# Patient Record
Sex: MALE | Race: WHITE | NOT HISPANIC OR LATINO
[De-identification: names, ages, dates, MRNs, and addresses within clinical notes are randomized per-mention and may not be internally consistent; named-entity substitution may affect disease eponyms.]

---

## 2017-04-06 ENCOUNTER — TRANSCRIPTION ENCOUNTER (OUTPATIENT)
Age: 27
End: 2017-04-06

## 2017-04-06 PROBLEM — Z00.00 ENCOUNTER FOR PREVENTIVE HEALTH EXAMINATION: Status: ACTIVE | Noted: 2017-04-06

## 2017-04-24 ENCOUNTER — APPOINTMENT (OUTPATIENT)
Dept: SURGERY | Facility: CLINIC | Age: 27
End: 2017-04-24

## 2017-05-01 ENCOUNTER — APPOINTMENT (OUTPATIENT)
Dept: SURGERY | Facility: CLINIC | Age: 27
End: 2017-05-01

## 2017-05-01 VITALS
OXYGEN SATURATION: 97 % | TEMPERATURE: 97.7 F | DIASTOLIC BLOOD PRESSURE: 90 MMHG | HEIGHT: 75 IN | HEART RATE: 100 BPM | WEIGHT: 315 LBS | BODY MASS INDEX: 39.17 KG/M2 | SYSTOLIC BLOOD PRESSURE: 134 MMHG | RESPIRATION RATE: 16 BRPM

## 2017-05-01 DIAGNOSIS — M10.9 GOUT, UNSPECIFIED: ICD-10-CM

## 2017-05-01 DIAGNOSIS — Z83.3 FAMILY HISTORY OF DIABETES MELLITUS: ICD-10-CM

## 2017-05-01 DIAGNOSIS — Z78.9 OTHER SPECIFIED HEALTH STATUS: ICD-10-CM

## 2017-09-11 ENCOUNTER — APPOINTMENT (OUTPATIENT)
Dept: SURGERY | Facility: CLINIC | Age: 27
End: 2017-09-11
Payer: COMMERCIAL

## 2017-09-11 PROCEDURE — 99215 OFFICE O/P EST HI 40 MIN: CPT

## 2017-09-22 ENCOUNTER — OUTPATIENT (OUTPATIENT)
Dept: OUTPATIENT SERVICES | Facility: HOSPITAL | Age: 27
LOS: 1 days | End: 2017-09-22
Payer: COMMERCIAL

## 2017-09-22 VITALS
TEMPERATURE: 98 F | WEIGHT: 315 LBS | DIASTOLIC BLOOD PRESSURE: 82 MMHG | SYSTOLIC BLOOD PRESSURE: 125 MMHG | HEART RATE: 84 BPM | OXYGEN SATURATION: 97 % | HEIGHT: 75 IN

## 2017-09-22 DIAGNOSIS — E66.01 MORBID (SEVERE) OBESITY DUE TO EXCESS CALORIES: ICD-10-CM

## 2017-09-22 DIAGNOSIS — Z01.818 ENCOUNTER FOR OTHER PREPROCEDURAL EXAMINATION: ICD-10-CM

## 2017-09-22 DIAGNOSIS — S42.009A FRACTURE OF UNSPECIFIED PART OF UNSPECIFIED CLAVICLE, INITIAL ENCOUNTER FOR CLOSED FRACTURE: Chronic | ICD-10-CM

## 2017-09-22 DIAGNOSIS — Z41.9 ENCOUNTER FOR PROCEDURE FOR PURPOSES OTHER THAN REMEDYING HEALTH STATE, UNSPECIFIED: Chronic | ICD-10-CM

## 2017-09-22 DIAGNOSIS — G47.33 OBSTRUCTIVE SLEEP APNEA (ADULT) (PEDIATRIC): ICD-10-CM

## 2017-09-22 LAB
BLD GP AB SCN SERPL QL: NEGATIVE — SIGNIFICANT CHANGE UP
HBA1C BLD-MCNC: 5.5 % — SIGNIFICANT CHANGE UP (ref 4–5.6)
HCT VFR BLD CALC: 40.7 % — SIGNIFICANT CHANGE UP (ref 39–50)
HGB BLD-MCNC: 13.6 G/DL — SIGNIFICANT CHANGE UP (ref 13–17)
MCHC RBC-ENTMCNC: 28.9 PG — SIGNIFICANT CHANGE UP (ref 27–34)
MCHC RBC-ENTMCNC: 33.4 GM/DL — SIGNIFICANT CHANGE UP (ref 32–36)
MCV RBC AUTO: 86.4 FL — SIGNIFICANT CHANGE UP (ref 80–100)
PLATELET # BLD AUTO: 214 K/UL — SIGNIFICANT CHANGE UP (ref 150–400)
RBC # BLD: 4.71 M/UL — SIGNIFICANT CHANGE UP (ref 4.2–5.8)
RBC # FLD: 12.8 % — SIGNIFICANT CHANGE UP (ref 10.3–14.5)
RH IG SCN BLD-IMP: POSITIVE — SIGNIFICANT CHANGE UP
WBC # BLD: 9.11 K/UL — SIGNIFICANT CHANGE UP (ref 3.8–10.5)
WBC # FLD AUTO: 9.11 K/UL — SIGNIFICANT CHANGE UP (ref 3.8–10.5)

## 2017-09-22 PROCEDURE — 86900 BLOOD TYPING SEROLOGIC ABO: CPT

## 2017-09-22 PROCEDURE — 83036 HEMOGLOBIN GLYCOSYLATED A1C: CPT

## 2017-09-22 PROCEDURE — 86850 RBC ANTIBODY SCREEN: CPT

## 2017-09-22 PROCEDURE — 86901 BLOOD TYPING SEROLOGIC RH(D): CPT

## 2017-09-22 PROCEDURE — G0463: CPT

## 2017-09-22 PROCEDURE — 80053 COMPREHEN METABOLIC PANEL: CPT

## 2017-09-22 PROCEDURE — 85027 COMPLETE CBC AUTOMATED: CPT

## 2017-09-22 NOTE — H&P PST ADULT - PSH
Collar bone fracture  repair of collar bone with hardware  Elective surgery  open reduction right hand 2012

## 2017-09-22 NOTE — H&P PST ADULT - HISTORY OF PRESENT ILLNESS
27 year old male with morbid obesity BMI 49.1  presents  to [facility] for Laparoscopic vertical sleeve gastrectomy 27 year old male with JAYLAN, morbid obesity BMI 49.1  presents  to PST for Laparoscopic vertical sleeve gastrectomy

## 2017-09-23 LAB
ALBUMIN SERPL ELPH-MCNC: 4.1 G/DL — SIGNIFICANT CHANGE UP (ref 3.3–5)
ALP SERPL-CCNC: 92 U/L — SIGNIFICANT CHANGE UP (ref 40–120)
ALT FLD-CCNC: 22 U/L — SIGNIFICANT CHANGE UP (ref 10–45)
ANION GAP SERPL CALC-SCNC: 17 MMOL/L — SIGNIFICANT CHANGE UP (ref 5–17)
AST SERPL-CCNC: 22 U/L — SIGNIFICANT CHANGE UP (ref 10–40)
BILIRUB SERPL-MCNC: 0.4 MG/DL — SIGNIFICANT CHANGE UP (ref 0.2–1.2)
BUN SERPL-MCNC: 21 MG/DL — SIGNIFICANT CHANGE UP (ref 7–23)
CALCIUM SERPL-MCNC: 9.6 MG/DL — SIGNIFICANT CHANGE UP (ref 8.4–10.5)
CHLORIDE SERPL-SCNC: 101 MMOL/L — SIGNIFICANT CHANGE UP (ref 96–108)
CO2 SERPL-SCNC: 23 MMOL/L — SIGNIFICANT CHANGE UP (ref 22–31)
CREAT SERPL-MCNC: 1.16 MG/DL — SIGNIFICANT CHANGE UP (ref 0.5–1.3)
GLUCOSE SERPL-MCNC: 107 MG/DL — HIGH (ref 70–99)
POTASSIUM SERPL-MCNC: 3.9 MMOL/L — SIGNIFICANT CHANGE UP (ref 3.5–5.3)
POTASSIUM SERPL-SCNC: 3.9 MMOL/L — SIGNIFICANT CHANGE UP (ref 3.5–5.3)
PROT SERPL-MCNC: 7.4 G/DL — SIGNIFICANT CHANGE UP (ref 6–8.3)
SODIUM SERPL-SCNC: 141 MMOL/L — SIGNIFICANT CHANGE UP (ref 135–145)

## 2017-10-04 ENCOUNTER — APPOINTMENT (OUTPATIENT)
Dept: SURGERY | Facility: HOSPITAL | Age: 27
End: 2017-10-04
Payer: COMMERCIAL

## 2017-10-04 ENCOUNTER — RESULT REVIEW (OUTPATIENT)
Age: 27
End: 2017-10-04

## 2017-10-04 ENCOUNTER — INPATIENT (INPATIENT)
Facility: HOSPITAL | Age: 27
LOS: 0 days | Discharge: HOME | DRG: 621 | End: 2017-10-05
Attending: SURGERY | Admitting: SURGERY
Payer: COMMERCIAL

## 2017-10-04 ENCOUNTER — TRANSCRIPTION ENCOUNTER (OUTPATIENT)
Age: 27
End: 2017-10-04

## 2017-10-04 VITALS
WEIGHT: 315 LBS | SYSTOLIC BLOOD PRESSURE: 123 MMHG | RESPIRATION RATE: 18 BRPM | TEMPERATURE: 98 F | HEIGHT: 75 IN | OXYGEN SATURATION: 97 % | HEART RATE: 65 BPM | DIASTOLIC BLOOD PRESSURE: 80 MMHG

## 2017-10-04 DIAGNOSIS — E66.01 MORBID (SEVERE) OBESITY DUE TO EXCESS CALORIES: ICD-10-CM

## 2017-10-04 DIAGNOSIS — Z41.9 ENCOUNTER FOR PROCEDURE FOR PURPOSES OTHER THAN REMEDYING HEALTH STATE, UNSPECIFIED: Chronic | ICD-10-CM

## 2017-10-04 DIAGNOSIS — M79.652 PAIN IN LEFT THIGH: ICD-10-CM

## 2017-10-04 DIAGNOSIS — S42.009A FRACTURE OF UNSPECIFIED PART OF UNSPECIFIED CLAVICLE, INITIAL ENCOUNTER FOR CLOSED FRACTURE: Chronic | ICD-10-CM

## 2017-10-04 LAB
ANION GAP SERPL CALC-SCNC: 14 MMOL/L — SIGNIFICANT CHANGE UP (ref 5–17)
BASOPHILS # BLD AUTO: 0 K/UL — SIGNIFICANT CHANGE UP (ref 0–0.2)
BASOPHILS NFR BLD AUTO: 0.1 % — SIGNIFICANT CHANGE UP (ref 0–2)
BUN SERPL-MCNC: 13 MG/DL — SIGNIFICANT CHANGE UP (ref 7–23)
CALCIUM SERPL-MCNC: 8.6 MG/DL — SIGNIFICANT CHANGE UP (ref 8.4–10.5)
CHLORIDE SERPL-SCNC: 101 MMOL/L — SIGNIFICANT CHANGE UP (ref 96–108)
CO2 SERPL-SCNC: 24 MMOL/L — SIGNIFICANT CHANGE UP (ref 22–31)
CREAT SERPL-MCNC: 1.05 MG/DL — SIGNIFICANT CHANGE UP (ref 0.5–1.3)
EOSINOPHIL # BLD AUTO: 0 K/UL — SIGNIFICANT CHANGE UP (ref 0–0.5)
EOSINOPHIL NFR BLD AUTO: 0.2 % — SIGNIFICANT CHANGE UP (ref 0–6)
GLUCOSE SERPL-MCNC: 159 MG/DL — HIGH (ref 70–99)
HCT VFR BLD CALC: 43.7 % — SIGNIFICANT CHANGE UP (ref 39–50)
HGB BLD-MCNC: 14.6 G/DL — SIGNIFICANT CHANGE UP (ref 13–17)
LYMPHOCYTES # BLD AUTO: 1.8 K/UL — SIGNIFICANT CHANGE UP (ref 1–3.3)
LYMPHOCYTES # BLD AUTO: 12.1 % — LOW (ref 13–44)
MAGNESIUM SERPL-MCNC: 2 MG/DL — SIGNIFICANT CHANGE UP (ref 1.6–2.6)
MCHC RBC-ENTMCNC: 30.4 PG — SIGNIFICANT CHANGE UP (ref 27–34)
MCHC RBC-ENTMCNC: 33.4 GM/DL — SIGNIFICANT CHANGE UP (ref 32–36)
MCV RBC AUTO: 91.2 FL — SIGNIFICANT CHANGE UP (ref 80–100)
MONOCYTES # BLD AUTO: 0.3 K/UL — SIGNIFICANT CHANGE UP (ref 0–0.9)
MONOCYTES NFR BLD AUTO: 2.2 % — SIGNIFICANT CHANGE UP (ref 2–14)
NEUTROPHILS # BLD AUTO: 12.5 K/UL — HIGH (ref 1.8–7.4)
NEUTROPHILS NFR BLD AUTO: 85.4 % — HIGH (ref 43–77)
PHOSPHATE SERPL-MCNC: 1.9 MG/DL — LOW (ref 2.5–4.5)
PLATELET # BLD AUTO: 188 K/UL — SIGNIFICANT CHANGE UP (ref 150–400)
POTASSIUM SERPL-MCNC: 4.2 MMOL/L — SIGNIFICANT CHANGE UP (ref 3.5–5.3)
POTASSIUM SERPL-SCNC: 4.2 MMOL/L — SIGNIFICANT CHANGE UP (ref 3.5–5.3)
RBC # BLD: 4.79 M/UL — SIGNIFICANT CHANGE UP (ref 4.2–5.8)
RBC # FLD: 11.6 % — SIGNIFICANT CHANGE UP (ref 10.3–14.5)
RH IG SCN BLD-IMP: POSITIVE — SIGNIFICANT CHANGE UP
SODIUM SERPL-SCNC: 139 MMOL/L — SIGNIFICANT CHANGE UP (ref 135–145)
WBC # BLD: 14.7 K/UL — HIGH (ref 3.8–10.5)
WBC # FLD AUTO: 14.7 K/UL — HIGH (ref 3.8–10.5)

## 2017-10-04 PROCEDURE — 88305 TISSUE EXAM BY PATHOLOGIST: CPT | Mod: 26

## 2017-10-04 PROCEDURE — 43775 LAP SLEEVE GASTRECTOMY: CPT

## 2017-10-04 RX ORDER — HYDROMORPHONE HYDROCHLORIDE 2 MG/ML
1 INJECTION INTRAMUSCULAR; INTRAVENOUS; SUBCUTANEOUS
Qty: 0 | Refills: 0 | Status: DISCONTINUED | OUTPATIENT
Start: 2017-10-04 | End: 2017-10-04

## 2017-10-04 RX ORDER — ACETAMINOPHEN 500 MG
1000 TABLET ORAL ONCE
Qty: 0 | Refills: 0 | Status: DISCONTINUED | OUTPATIENT
Start: 2017-10-05 | End: 2017-10-05

## 2017-10-04 RX ORDER — SODIUM CHLORIDE 9 MG/ML
3 INJECTION INTRAMUSCULAR; INTRAVENOUS; SUBCUTANEOUS EVERY 8 HOURS
Qty: 0 | Refills: 0 | Status: DISCONTINUED | OUTPATIENT
Start: 2017-10-04 | End: 2017-10-04

## 2017-10-04 RX ORDER — SODIUM CHLORIDE 9 MG/ML
1000 INJECTION, SOLUTION INTRAVENOUS
Qty: 0 | Refills: 0 | Status: DISCONTINUED | OUTPATIENT
Start: 2017-10-04 | End: 2017-10-05

## 2017-10-04 RX ORDER — POTASSIUM CHLORIDE 20 MEQ
10 PACKET (EA) ORAL
Qty: 0 | Refills: 0 | Status: DISCONTINUED | OUTPATIENT
Start: 2017-10-04 | End: 2017-10-05

## 2017-10-04 RX ORDER — CEFAZOLIN SODIUM 1 G
3000 VIAL (EA) INJECTION EVERY 8 HOURS
Qty: 0 | Refills: 0 | Status: COMPLETED | OUTPATIENT
Start: 2017-10-04 | End: 2017-10-05

## 2017-10-04 RX ORDER — ONDANSETRON 8 MG/1
4 TABLET, FILM COATED ORAL ONCE
Qty: 0 | Refills: 0 | Status: COMPLETED | OUTPATIENT
Start: 2017-10-04 | End: 2017-10-04

## 2017-10-04 RX ORDER — PANTOPRAZOLE SODIUM 20 MG/1
40 TABLET, DELAYED RELEASE ORAL DAILY
Qty: 0 | Refills: 0 | Status: DISCONTINUED | OUTPATIENT
Start: 2017-10-04 | End: 2017-10-05

## 2017-10-04 RX ORDER — ONDANSETRON 8 MG/1
4 TABLET, FILM COATED ORAL EVERY 6 HOURS
Qty: 0 | Refills: 0 | Status: DISCONTINUED | OUTPATIENT
Start: 2017-10-04 | End: 2017-10-05

## 2017-10-04 RX ORDER — CEFAZOLIN SODIUM 1 G
3000 VIAL (EA) INJECTION ONCE
Qty: 0 | Refills: 0 | Status: DISCONTINUED | OUTPATIENT
Start: 2017-10-04 | End: 2017-10-05

## 2017-10-04 RX ORDER — ACETAMINOPHEN 500 MG
1000 TABLET ORAL ONCE
Qty: 0 | Refills: 0 | Status: COMPLETED | OUTPATIENT
Start: 2017-10-04 | End: 2017-10-04

## 2017-10-04 RX ORDER — HEPARIN SODIUM 5000 [USP'U]/ML
5000 INJECTION INTRAVENOUS; SUBCUTANEOUS ONCE
Qty: 0 | Refills: 0 | Status: COMPLETED | OUTPATIENT
Start: 2017-10-04 | End: 2017-10-04

## 2017-10-04 RX ORDER — ACETAMINOPHEN 500 MG
1000 TABLET ORAL ONCE
Qty: 0 | Refills: 0 | Status: COMPLETED | OUTPATIENT
Start: 2017-10-05 | End: 2017-10-05

## 2017-10-04 RX ORDER — HEPARIN SODIUM 5000 [USP'U]/ML
5000 INJECTION INTRAVENOUS; SUBCUTANEOUS EVERY 8 HOURS
Qty: 0 | Refills: 0 | Status: DISCONTINUED | OUTPATIENT
Start: 2017-10-04 | End: 2017-10-05

## 2017-10-04 RX ORDER — HYOSCYAMINE SULFATE 0.13 MG
0.12 TABLET ORAL EVERY 6 HOURS
Qty: 0 | Refills: 0 | Status: DISCONTINUED | OUTPATIENT
Start: 2017-10-04 | End: 2017-10-05

## 2017-10-04 RX ORDER — KETOROLAC TROMETHAMINE 30 MG/ML
30 SYRINGE (ML) INJECTION EVERY 6 HOURS
Qty: 0 | Refills: 0 | Status: DISCONTINUED | OUTPATIENT
Start: 2017-10-04 | End: 2017-10-05

## 2017-10-04 RX ADMIN — HYDROMORPHONE HYDROCHLORIDE 1 MILLIGRAM(S): 2 INJECTION INTRAMUSCULAR; INTRAVENOUS; SUBCUTANEOUS at 10:01

## 2017-10-04 RX ADMIN — PANTOPRAZOLE SODIUM 40 MILLIGRAM(S): 20 TABLET, DELAYED RELEASE ORAL at 12:13

## 2017-10-04 RX ADMIN — Medication 400 MILLIGRAM(S): at 21:55

## 2017-10-04 RX ADMIN — Medication 63.75 MILLIMOLE(S): at 18:57

## 2017-10-04 RX ADMIN — SODIUM CHLORIDE 250 MILLILITER(S): 9 INJECTION, SOLUTION INTRAVENOUS at 11:21

## 2017-10-04 RX ADMIN — HYDROMORPHONE HYDROCHLORIDE 1 MILLIGRAM(S): 2 INJECTION INTRAMUSCULAR; INTRAVENOUS; SUBCUTANEOUS at 10:42

## 2017-10-04 RX ADMIN — HYDROMORPHONE HYDROCHLORIDE 1 MILLIGRAM(S): 2 INJECTION INTRAMUSCULAR; INTRAVENOUS; SUBCUTANEOUS at 11:00

## 2017-10-04 RX ADMIN — Medication 1000 MILLIGRAM(S): at 16:20

## 2017-10-04 RX ADMIN — Medication 30 MILLIGRAM(S): at 20:37

## 2017-10-04 RX ADMIN — ONDANSETRON 4 MILLIGRAM(S): 8 TABLET, FILM COATED ORAL at 10:37

## 2017-10-04 RX ADMIN — Medication 200 MILLIGRAM(S): at 17:19

## 2017-10-04 RX ADMIN — HEPARIN SODIUM 5000 UNIT(S): 5000 INJECTION INTRAVENOUS; SUBCUTANEOUS at 14:41

## 2017-10-04 RX ADMIN — Medication 1000 MILLIGRAM(S): at 22:25

## 2017-10-04 RX ADMIN — HYDROMORPHONE HYDROCHLORIDE 1 MILLIGRAM(S): 2 INJECTION INTRAMUSCULAR; INTRAVENOUS; SUBCUTANEOUS at 12:36

## 2017-10-04 RX ADMIN — Medication 0.12 MILLIGRAM(S): at 12:15

## 2017-10-04 RX ADMIN — ONDANSETRON 4 MILLIGRAM(S): 8 TABLET, FILM COATED ORAL at 17:19

## 2017-10-04 RX ADMIN — Medication 400 MILLIGRAM(S): at 16:05

## 2017-10-04 RX ADMIN — SODIUM CHLORIDE 150 MILLILITER(S): 9 INJECTION, SOLUTION INTRAVENOUS at 16:05

## 2017-10-04 RX ADMIN — HEPARIN SODIUM 5000 UNIT(S): 5000 INJECTION INTRAVENOUS; SUBCUTANEOUS at 06:14

## 2017-10-04 RX ADMIN — HYDROMORPHONE HYDROCHLORIDE 1 MILLIGRAM(S): 2 INJECTION INTRAMUSCULAR; INTRAVENOUS; SUBCUTANEOUS at 10:15

## 2017-10-04 RX ADMIN — Medication 30 MILLIGRAM(S): at 15:00

## 2017-10-04 RX ADMIN — HYDROMORPHONE HYDROCHLORIDE 1 MILLIGRAM(S): 2 INJECTION INTRAMUSCULAR; INTRAVENOUS; SUBCUTANEOUS at 12:51

## 2017-10-04 RX ADMIN — Medication 30 MILLILITER(S): at 22:08

## 2017-10-04 RX ADMIN — HEPARIN SODIUM 5000 UNIT(S): 5000 INJECTION INTRAVENOUS; SUBCUTANEOUS at 21:57

## 2017-10-04 RX ADMIN — ONDANSETRON 4 MILLIGRAM(S): 8 TABLET, FILM COATED ORAL at 12:14

## 2017-10-04 RX ADMIN — Medication 30 MILLIGRAM(S): at 21:07

## 2017-10-04 RX ADMIN — Medication 0.12 MILLIGRAM(S): at 17:19

## 2017-10-04 NOTE — PHARMACY COMMUNICATION NOTE - COMMENTS
Patient medication reconciliation done. Patient currently taking no medications. Patient was re-educated to take daily multi-vitamins post surgically. Patient re-educated on NSAID avoidance (Ibuprofen, ASA, naproxen, aleve) as they increased risk of GI bleeding. Patient educated to use APAP for mild pain otherwise contact prescriber for consult.

## 2017-10-04 NOTE — BRIEF OPERATIVE NOTE - PROCEDURE
<<-----Click on this checkbox to enter Procedure Gastrectomy, sleeve, laparoscopic  10/04/2017    Active  LEANNE

## 2017-10-04 NOTE — BRIEF OPERATIVE NOTE - OPERATION/FINDINGS
Sleeve gastrectomy performed over 36-Fr calibration tube. Staple line reinforced with sutures and Evicel. Negative methylene blue leak test.

## 2017-10-04 NOTE — PROGRESS NOTE ADULT - SUBJECTIVE AND OBJECTIVE BOX
Green surgery Post op Note   Procedure: lap sleeve gastrectomy     Subjective: pain controlled, no n/v, ambulated around PACU no issues, no difficulty breathing, no SOB, no CP    PE:  NAD   AAOx3   abd soft obese, nontender, incision sites c/d/i supraumbilical trochar site with some serosang drainage on steris    Vital Signs Last 24 Hrs  T(C): 36.7 (04 Oct 2017 09:43), Max: 36.8 (04 Oct 2017 06:04)  T(F): 98.1 (04 Oct 2017 09:43), Max: 98.2 (04 Oct 2017 06:04)  HR: 91 (04 Oct 2017 13:15) (59 - 95)  BP: 132/74 (04 Oct 2017 13:15) (123/80 - 159/92)  BP(mean): 95 (04 Oct 2017 13:15) (90 - 117)  RR: 16 (04 Oct 2017 13:15) (15 - 18)  SpO2: 94% (04 Oct 2017 13:15) (94% - 100%)    I&O's Detail    04 Oct 2017 07:01  -  04 Oct 2017 13:27  --------------------------------------------------------  IN:    lactated ringers.: 150 mL    multivitamin/thiamine/folic acid in sodium chloride 0.9%: 500 mL  Total IN: 650 mL    OUT:    Indwelling Catheter - Urethral: 510 mL  Total OUT: 510 mL    Total NET: 140 mL          Daily Height in cm: 190.5 (04 Oct 2017 06:04)    Daily     MEDICATIONS  (STANDING):  acetaminophen  IVPB. 1000 milliGRAM(s) IV Intermittent once  acetaminophen  IVPB. 1000 milliGRAM(s) IV Intermittent once  ceFAZolin   IVPB 3000 milliGRAM(s) IV Intermittent every 8 hours  ceFAZolin   IVPB 3000 milliGRAM(s) IV Intermittent once  heparin  Injectable 5000 Unit(s) SubCutaneous every 8 hours  hyoscyamine SL 0.125 milliGRAM(s) SubLingual every 6 hours  ketorolac   Injectable 30 milliGRAM(s) IV Push every 6 hours  lactated ringers. 1000 milliLiter(s) (150 mL/Hr) IV Continuous <Continuous>  multivitamin/thiamine/folic acid in sodium chloride 0.9% 1000 milliLiter(s) (250 mL/Hr) IV Continuous <Continuous>  ondansetron Injectable 4 milliGRAM(s) IV Push every 6 hours  pantoprazole  Injectable 40 milliGRAM(s) IV Push daily  sodium phosphate IVPB 15 milliMole(s) IV Intermittent once    MEDICATIONS  (PRN):  aluminum hydroxide/magnesium hydroxide/simethicone Suspension 30 milliLiter(s) Oral every 4 hours PRN Dyspepsia  potassium chloride  10 mEq/100 mL IVPB 10 milliEquivalent(s) IV Intermittent every 1 hour PRN IF post-operative potassium is LESS THEN 3.5 milliMole(s)/L      LABS:                        14.6   14.7  )-----------( 188      ( 04 Oct 2017 10:38 )             43.7     10-04    139  |  101  |  13  ----------------------------<  159<H>  4.2   |  24  |  1.05    Ca    8.6      04 Oct 2017 10:38  Phos  1.9     10-04  Mg     2.0     10-04            RADIOLOGY & ADDITIONAL STUDIES:

## 2017-10-04 NOTE — PROGRESS NOTE ADULT - ASSESSMENT
28 yo male s/p lap sleeve gastrectomy   doing well in PACU  -ok to transfer to [facility]  -remove dimas at 1400 check dtv   -oob ambulate     Corinna Belle Sx [phone]

## 2017-10-05 ENCOUNTER — TRANSCRIPTION ENCOUNTER (OUTPATIENT)
Age: 27
End: 2017-10-05

## 2017-10-05 VITALS — WEIGHT: 196.43 LBS

## 2017-10-05 LAB
ANION GAP SERPL CALC-SCNC: 11 MMOL/L — SIGNIFICANT CHANGE UP (ref 5–17)
BASOPHILS # BLD AUTO: 0 K/UL — SIGNIFICANT CHANGE UP (ref 0–0.2)
BASOPHILS NFR BLD AUTO: 0 % — SIGNIFICANT CHANGE UP (ref 0–2)
BUN SERPL-MCNC: 11 MG/DL — SIGNIFICANT CHANGE UP (ref 7–23)
CALCIUM SERPL-MCNC: 9 MG/DL — SIGNIFICANT CHANGE UP (ref 8.4–10.5)
CHLORIDE SERPL-SCNC: 103 MMOL/L — SIGNIFICANT CHANGE UP (ref 96–108)
CO2 SERPL-SCNC: 25 MMOL/L — SIGNIFICANT CHANGE UP (ref 22–31)
CREAT SERPL-MCNC: 1.06 MG/DL — SIGNIFICANT CHANGE UP (ref 0.5–1.3)
EOSINOPHIL # BLD AUTO: 0 K/UL — SIGNIFICANT CHANGE UP (ref 0–0.5)
EOSINOPHIL NFR BLD AUTO: 0.1 % — SIGNIFICANT CHANGE UP (ref 0–6)
GLUCOSE SERPL-MCNC: 103 MG/DL — HIGH (ref 70–99)
HCT VFR BLD CALC: 40.8 % — SIGNIFICANT CHANGE UP (ref 39–50)
HGB BLD-MCNC: 13.8 G/DL — SIGNIFICANT CHANGE UP (ref 13–17)
LYMPHOCYTES # BLD AUTO: 1.5 K/UL — SIGNIFICANT CHANGE UP (ref 1–3.3)
LYMPHOCYTES # BLD AUTO: 16.3 % — SIGNIFICANT CHANGE UP (ref 13–44)
MAGNESIUM SERPL-MCNC: 2.2 MG/DL — SIGNIFICANT CHANGE UP (ref 1.6–2.6)
MCHC RBC-ENTMCNC: 30.8 PG — SIGNIFICANT CHANGE UP (ref 27–34)
MCHC RBC-ENTMCNC: 33.8 GM/DL — SIGNIFICANT CHANGE UP (ref 32–36)
MCV RBC AUTO: 91 FL — SIGNIFICANT CHANGE UP (ref 80–100)
MONOCYTES # BLD AUTO: 0.7 K/UL — SIGNIFICANT CHANGE UP (ref 0–0.9)
MONOCYTES NFR BLD AUTO: 7.1 % — SIGNIFICANT CHANGE UP (ref 2–14)
NEUTROPHILS # BLD AUTO: 7.1 K/UL — SIGNIFICANT CHANGE UP (ref 1.8–7.4)
NEUTROPHILS NFR BLD AUTO: 76.5 % — SIGNIFICANT CHANGE UP (ref 43–77)
PHOSPHATE SERPL-MCNC: 2.6 MG/DL — SIGNIFICANT CHANGE UP (ref 2.5–4.5)
PLATELET # BLD AUTO: 201 K/UL — SIGNIFICANT CHANGE UP (ref 150–400)
POTASSIUM SERPL-MCNC: 4.1 MMOL/L — SIGNIFICANT CHANGE UP (ref 3.5–5.3)
POTASSIUM SERPL-SCNC: 4.1 MMOL/L — SIGNIFICANT CHANGE UP (ref 3.5–5.3)
RBC # BLD: 4.48 M/UL — SIGNIFICANT CHANGE UP (ref 4.2–5.8)
RBC # FLD: 11.7 % — SIGNIFICANT CHANGE UP (ref 10.3–14.5)
SODIUM SERPL-SCNC: 139 MMOL/L — SIGNIFICANT CHANGE UP (ref 135–145)
WBC # BLD: 9.2 K/UL — SIGNIFICANT CHANGE UP (ref 3.8–10.5)
WBC # FLD AUTO: 9.2 K/UL — SIGNIFICANT CHANGE UP (ref 3.8–10.5)

## 2017-10-05 PROCEDURE — 80048 BASIC METABOLIC PNL TOTAL CA: CPT

## 2017-10-05 PROCEDURE — 88305 TISSUE EXAM BY PATHOLOGIST: CPT

## 2017-10-05 PROCEDURE — 86901 BLOOD TYPING SEROLOGIC RH(D): CPT

## 2017-10-05 PROCEDURE — 86900 BLOOD TYPING SEROLOGIC ABO: CPT

## 2017-10-05 PROCEDURE — 84100 ASSAY OF PHOSPHORUS: CPT

## 2017-10-05 PROCEDURE — C1889: CPT

## 2017-10-05 PROCEDURE — 85027 COMPLETE CBC AUTOMATED: CPT

## 2017-10-05 PROCEDURE — 83735 ASSAY OF MAGNESIUM: CPT

## 2017-10-05 RX ORDER — OMEPRAZOLE 10 MG/1
1 CAPSULE, DELAYED RELEASE ORAL
Qty: 30 | Refills: 0 | OUTPATIENT
Start: 2017-10-05 | End: 2017-11-04

## 2017-10-05 RX ORDER — ONDANSETRON 8 MG/1
1 TABLET, FILM COATED ORAL
Qty: 21 | Refills: 0 | OUTPATIENT
Start: 2017-10-05 | End: 2017-10-12

## 2017-10-05 RX ORDER — OXYCODONE HYDROCHLORIDE 5 MG/1
5 TABLET ORAL
Qty: 120 | Refills: 0 | OUTPATIENT
Start: 2017-10-05 | End: 2017-10-09

## 2017-10-05 RX ORDER — HYOSCYAMINE SULFATE 0.13 MG
1 TABLET ORAL
Qty: 28 | Refills: 0 | OUTPATIENT
Start: 2017-10-05 | End: 2017-10-12

## 2017-10-05 RX ORDER — OXYCODONE HYDROCHLORIDE 5 MG/1
5 TABLET ORAL EVERY 4 HOURS
Qty: 0 | Refills: 0 | Status: DISCONTINUED | OUTPATIENT
Start: 2017-10-05 | End: 2017-10-05

## 2017-10-05 RX ADMIN — Medication 200 MILLIGRAM(S): at 02:42

## 2017-10-05 RX ADMIN — Medication 30 MILLIGRAM(S): at 03:10

## 2017-10-05 RX ADMIN — Medication 30 MILLIGRAM(S): at 09:15

## 2017-10-05 RX ADMIN — ONDANSETRON 4 MILLIGRAM(S): 8 TABLET, FILM COATED ORAL at 05:42

## 2017-10-05 RX ADMIN — Medication 400 MILLIGRAM(S): at 05:40

## 2017-10-05 RX ADMIN — Medication 0.12 MILLIGRAM(S): at 11:52

## 2017-10-05 RX ADMIN — Medication 1000 MILLIGRAM(S): at 06:10

## 2017-10-05 RX ADMIN — Medication 30 MILLIGRAM(S): at 02:40

## 2017-10-05 RX ADMIN — ONDANSETRON 4 MILLIGRAM(S): 8 TABLET, FILM COATED ORAL at 11:52

## 2017-10-05 RX ADMIN — HEPARIN SODIUM 5000 UNIT(S): 5000 INJECTION INTRAVENOUS; SUBCUTANEOUS at 05:45

## 2017-10-05 RX ADMIN — Medication 0.12 MILLIGRAM(S): at 02:38

## 2017-10-05 RX ADMIN — PANTOPRAZOLE SODIUM 40 MILLIGRAM(S): 20 TABLET, DELAYED RELEASE ORAL at 11:52

## 2017-10-05 RX ADMIN — ONDANSETRON 4 MILLIGRAM(S): 8 TABLET, FILM COATED ORAL at 02:39

## 2017-10-05 RX ADMIN — Medication 30 MILLIGRAM(S): at 08:44

## 2017-10-05 RX ADMIN — SODIUM CHLORIDE 150 MILLILITER(S): 9 INJECTION, SOLUTION INTRAVENOUS at 02:35

## 2017-10-05 RX ADMIN — Medication 0.12 MILLIGRAM(S): at 05:42

## 2017-10-05 NOTE — DISCHARGE NOTE ADULT - CARE PLAN
Principal Discharge DX:	Morbid obesity  Goal:	Pt will adapt to new lifestyle changes for improve quality of life  Instructions for follow-up, activity and diet:	as explained and outline don the gastric sleeve instructions. Go to your nearest emergency room for shortness of breath, chest pain, difficulty breathing. If you experience nausea, vomiting fever, increase abdominal pain, redness and or increase drainage from the abdominal scope sites, retching, call your surgeons office immediately.

## 2017-10-05 NOTE — DISCHARGE NOTE ADULT - MEDICATION SUMMARY - MEDICATIONS TO TAKE
I will START or STAY ON the medications listed below when I get home from the hospital:    oxyCODONE 5 mg/5 mL oral solution  -- 5 milliliter(s) by mouth every 4 hours MDD:30 as needed   -- Caution federal law prohibits the transfer of this drug to any person other  than the person for whom it was prescribed.  May cause drowsiness.  Alcohol may intensify this effect.  Use care when operating dangerous machinery.  This prescription cannot be refilled.  Using more of this medication than prescribed may cause serious breathing problems.    -- Indication: For pain    Zofran ODT 4 mg oral tablet, disintegrating  -- 1 tab(s) by mouth 3 times a day MDD:3 as needed  -- Indication: For nausea    hyoscyamine 0.125 mg sublingual tablet  -- 1 tab(s) under tongue every 6 hours MDD:4 as needed for gastric spasm  -- May cause drowsiness.  Alcohol may intensify this effect.  Use care when operating dangerous machinery.    -- Indication: For gastric spasm    omeprazole 40 mg oral delayed release capsule  -- 1 cap(s) by mouth once a day MDD:1 open and mix in unsweetned applesauce  -- do not swallow whole,mix content in applesauce  -- Indication: For reflux

## 2017-10-05 NOTE — DIETITIAN INITIAL EVALUATION ADULT. - NS AS NUTRI INTERV ED CONTENT
1. Laparoscopic sleeve gastrectomy nutrition recommendations reviewed/reinforced (discharge instruction handout references). Bariatric dull liquid diet reviewed- sugar free, caffeine free, no carbonated beverages, low fat, no straws, no chewing gym, 6 small meals/day, and sipping beverages: 1 ounce servings every 15-20 minutes as tolerated, no water during meals- drink water 1 hour before or after meals, meeting hydration and protein needs. Discussed vitamin/mineral supplementation compliance as instructed by team. Pt encouraged to follow-up with outpatient RD. 2. RD to remain available to reinforce nutrition education as requested by patient/family/caregiver.

## 2017-10-05 NOTE — DIETITIAN INITIAL EVALUATION ADULT. - OTHER INFO
Pt seen for bariatric surgery consult on 7TOW. Per outpatient RD note on 8/21/17, pt weighed 388.6 pounds. Pt with multiple wt loss attempts including watching quality and quantity of food consumed. Pt reports weighting 393 pounds before adhering to 2-week full liquid diet. Pre-surgical H&P wt of 392 pounds (9/22/17). Wt noted of 380 pounds (10/4/17). Pt denies GI distress at this time. Pt with knowledge of bariatric full liquid diet and receptive to additional in-depth review/reinforcement. Pt reports purchasing outpatient RD approved protein shakes in addition to necessary vitamins/minerals. Reinforced the importance of consuming liquid/chewable/crushable multivitamin with elemental iron at least 2 hours apart from liquid/chewable/crushable calcium citrate with vitamin D to promote optimal absorption. Pt states he has a follow-up appointment scheduled with outpatient RD. Pt able to teach back all points discussed during interview.

## 2017-10-05 NOTE — DISCHARGE NOTE ADULT - HOSPITAL COURSE
On October 4, 2017 Mr Spangler underwent Laparoscopic Gastric Sleeve for morbid obesity, BMI 47.  Prior to start of procedure, he received VTE and SSI prophylaxis. Following intubation, indwelling cochran placed. The procedure went as planned,  intraoperative  leak test showed no evidence of leak. He was subsequently extubated in the OR and taken to the recovery room. Postoperatively his pain was managed with opioid and non-opioid analgesia. Once hemodynamically stable he was transferred to the bariatric unit and placed on bedside continuous pulse oximetry. He was able to ambulate with assistance and began sips of bariatric liquid day of surgery. Cochran removed and spontaneous return of bladder function.      On POD#1, he remained stable, effective pain control, ambulating independently. He tolerated increase oral intake. The rest of his hospital course was uneventful and he was subsequently cleared for discharge. Procedure specific written discharge instructions explained to include signs and symptoms of postop complications and VTE patient education. Written materials provided. Routine bariatric medications obtained from pharmacy prior to discharge. He also received education on dietary guidelines and advised to follow the protocol-derived staged meal progression supervised by his dietitian. He will follow up with his dietitian in 30 days as well as with Dr Felder  in 7-10 days for his first post-operative visit. Subsequent visits will be at one, three and six months, then annually.

## 2017-10-05 NOTE — PROGRESS NOTE ADULT - SUBJECTIVE AND OBJECTIVE BOX
Post Op Day#: 1    Subjective: Im feeling good, good pain relief, walked a lot last night".    Objective: Denies N/V, effective pain control, ambulating independently. Afebrile, v/s stable.  Tolerating bariatric liquid.  Voiding                                               Vital Signs Last 24 Hrs  T(C): 36.5 (05 Oct 2017 08:57), Max: 36.8 (04 Oct 2017 17:29)  T(F): 97.7 (05 Oct 2017 08:57), Max: 98.3 (05 Oct 2017 04:20)  HR: 66 (05 Oct 2017 08:57) (59 - 110)  BP: 127/82 (05 Oct 2017 08:57) (114/67 - 159/92)  BP(mean): 91 (04 Oct 2017 14:00) (88 - 117)  RR: 18 (05 Oct 2017 08:57) (15 - 18)  SpO2: 96% (05 Oct 2017 08:57) (94% - 100%)                                               I&O's Summary    04 Oct 2017 07:01  -  05 Oct 2017 07:00  --------------------------------------------------------  IN: 3400 mL / OUT: 3080 mL / NET: 320 mL    05 Oct 2017 07:01  -  05 Oct 2017 09:07  --------------------------------------------------------  IN: 0 mL / OUT: 550 mL / NET: -550 mL    ORAL INTAKE 720ML OF BARIATRIC LIQUID                                                                      13.8   9.2   )-----------( 201      ( 05 Oct 2017 07:17 )             40.8                                                 10-05    139  |  103  |  11  ----------------------------<  103<H>  4.1   |  25  |  1.06    Ca    9.0      05 Oct 2017 07:17  Phos  2.6     10-05  Mg     2.2     10-05      Physical Exam:         Lungs:  clear breath sounds b/l       Heart:  Regular rate & rhythm       Abdomen:  Obese, soft, non-distended.  Scopes sites with steris strips clean, dry and intact. + bs, - flatus, no rebound or guarding       Skin:  No rash       Extremities: + pulses, no edema, no calf tenderness, negative homans                Caprini VTE Risk Score: 3-4  (moderate) No extended prophylaxis recommended post-discharge  [facility] VTE RISK SCORE:  9 (low) No extended prophylaxis recommended post-discharge    Assessment and Plan: s/p Lap Sleeve Gastrectomy    - Bariatric Clear diet today and then protocol derived staged meal progression supervised by RD, F/U with RD in 30 days  - GI prophylaxis, Incentive spirometry  - Ambulation at least every 3 hours or as tolerated  - Procedure specific education including s/s of postop complications, diet, vitamins and VTE prevention. Written materials given  - Medication reviewed and reconciled, Bariatric meds obtained from [facility]  - Met Bariatric 8-Point d/c criteria   - Follow up with Dr Felder in 7-10 days, PMD in 30 days.      Lauren Lambert, LUPE, ANP  [phone]

## 2017-10-05 NOTE — DIETITIAN INITIAL EVALUATION ADULT. - ENERGY NEEDS
ht = 75 inches, wt = 172.7kg (10/4/17), BMI = 47.5kg/m2, IBW = 89.1kg, 194%IBW  Fluid: consumption of at least 64 ounces (8 cups of fluid) per day     1+ edema left foot, no pressure injuries noted

## 2017-10-05 NOTE — DIETITIAN INITIAL EVALUATION ADULT. - PERTINENT MEDS FT
Lactated Ringers 150ml/hour, MVI, Thiamine, Folic Acid, Aluminum Hydroxide/Magnesium Hydroxide/Simethicone Suspension, Zofran, Protonix, Potassium Chloride

## 2017-10-05 NOTE — DISCHARGE NOTE ADULT - PLAN OF CARE
Pt will adapt to new lifestyle changes for improve quality of life as explained and outline don the gastric sleeve instructions. Go to your nearest emergency room for shortness of breath, chest pain, difficulty breathing. If you experience nausea, vomiting fever, increase abdominal pain, redness and or increase drainage from the abdominal scope sites, retching, call your surgeons office immediately.

## 2017-10-05 NOTE — DISCHARGE NOTE ADULT - INSTRUCTIONS
Bariatric Full liquid diet starting tomorrow. Follow up with your dietitian in 30 days. Avoid carbonated beverages f/u with md

## 2017-10-05 NOTE — DIETITIAN INITIAL EVALUATION ADULT. - ADHERENCE
Pt reports following a full liquid diet for 2 weeks PTA as instructed by outpatient RD. Pt reports having pureed chicken and vegetables, premier protein shakes, sugar free jellos and puddings all of which are approved by outpatient RD.

## 2017-10-05 NOTE — PROGRESS NOTE ADULT - SUBJECTIVE AND OBJECTIVE BOX
Bariatric Surgery Progress Note    Post Op Day#:     24 hr events/Subjective:     No acute issues overnight  Pain controlled with medications  Nausea controlled with anti-ematics   Voiding      Procedure:  Gastrectomy, sleeve, laparoscopic      Vital Signs Last 24 Hrs  T(C): 36.8 (05 Oct 2017 04:20), Max: 36.8 (04 Oct 2017 06:04)  T(F): 98.3 (05 Oct 2017 04:20), Max: 98.3 (05 Oct 2017 04:20)  HR: 61 (05 Oct 2017 04:20) (59 - 110)  BP: 116/64 (05 Oct 2017 04:20) (114/67 - 159/92)  BP(mean): 91 (04 Oct 2017 14:00) (88 - 117)  RR: 18 (05 Oct 2017 04:20) (15 - 18)  SpO2: 96% (05 Oct 2017 04:20) (94% - 100%)  Height (cm): 190.5 (10-04 @ 06:04)  Weight (kg): 172.7 (10-04 @ 06:04)  BMI (kg/m2): 47.6 (10-04 @ 06:04)  BSA (m2): 2.88 (10-04 @ 06:04)  I&O's Summary    04 Oct 2017 07:01  -  05 Oct 2017 05:24  --------------------------------------------------------  IN: 2852 mL / OUT: 3080 mL / NET: -228 mL      I&O's Detail    04 Oct 2017 07:01  -  05 Oct 2017 05:24  --------------------------------------------------------  IN:    IV PiggyBack: 650 mL    lactated ringers.: 1022 mL    multivitamin/thiamine/folic acid in sodium chloride 0.9%: 1150 mL    Oral Fluid: 30 mL  Total IN: 2852 mL    OUT:    Indwelling Catheter - Urethral: 555 mL    Voided: 2525 mL  Total OUT: 3080 mL    Total NET: -228 mL          Physical Exam:    General:  Appears stated age, well-groomed, well-nourished, no distress  Chest:  clear breath sounds  Cardiovascular:  Regular rate & rhythm  Abdomen:  Soft, incisions clean, dry intact, tenderness around incisions, no rebound or guarding  Skin:  No rash  Neuro/Psych:  Alert, oriented to time, place and person                           14.6   14.7  )-----------( 188      ( 04 Oct 2017 10:38 )             43.7       10-04    139  |  101  |  13  ----------------------------<  159<H>  4.2   |  24  |  1.05    Ca    8.6      04 Oct 2017 10:38  Phos  1.9     10-04  Mg     2.0     10-04        acetaminophen  IVPB. milliGRAM(s) IV Intermittent once  acetaminophen  IVPB. milliGRAM(s) IV Intermittent once  aluminum hydroxide/magnesium hydroxide/simethicone Suspension milliLiter(s) Oral every 4 hours PRN  ceFAZolin   IVPB milliGRAM(s) IV Intermittent once  heparin  Injectable Unit(s) SubCutaneous every 8 hours  hyoscyamine SL milliGRAM(s) SubLingual every 6 hours  ketorolac   Injectable milliGRAM(s) IV Push every 6 hours  lactated ringers. milliLiter(s) IV Continuous <Continuous>  multivitamin/thiamine/folic acid in sodium chloride 0.9% milliLiter(s) IV Continuous <Continuous>  ondansetron Injectable milliGRAM(s) IV Push every 6 hours  pantoprazole  Injectable milliGRAM(s) IV Push daily  potassium chloride  10 mEq/100 mL IVPB milliEquivalent(s) IV Intermittent every 1 hour PRN          Assessment and Plan:  27y y/o Male s/p Gastrectomy, sleeve, laparoscopic  , POD #     - Start PO liquid oxycodone PRN for pain  - Continue ambulation   - Encourage Incentive Spirometer use  - Bariartic clears   - Continue chemical and mechanical DVT prophylaxis  - Discharge home once tolerating adequate PO and 8 point discharge criteria met    Discuss with attending Bariatric Surgery Progress Note    Post Op Day#:     24 hr events/Subjective:   +/-  Burping  No acute issues overnight  Pain controlled with medications  Nausea controlled with anti-ematics   Voiding      Procedure:  Gastrectomy, sleeve, laparoscopic      Vital Signs Last 24 Hrs  T(C): 36.8 (05 Oct 2017 04:20), Max: 36.8 (04 Oct 2017 06:04)  T(F): 98.3 (05 Oct 2017 04:20), Max: 98.3 (05 Oct 2017 04:20)  HR: 61 (05 Oct 2017 04:20) (59 - 110)  BP: 116/64 (05 Oct 2017 04:20) (114/67 - 159/92)  BP(mean): 91 (04 Oct 2017 14:00) (88 - 117)  RR: 18 (05 Oct 2017 04:20) (15 - 18)  SpO2: 96% (05 Oct 2017 04:20) (94% - 100%)  Height (cm): 190.5 (10-04 @ 06:04)  Weight (kg): 172.7 (10-04 @ 06:04)  BMI (kg/m2): 47.6 (10-04 @ 06:04)  BSA (m2): 2.88 (10-04 @ 06:04)  I&O's Summary    04 Oct 2017 07:01  -  05 Oct 2017 05:24  --------------------------------------------------------  IN: 2852 mL / OUT: 3080 mL / NET: -228 mL      I&O's Detail    04 Oct 2017 07:01  -  05 Oct 2017 05:24  --------------------------------------------------------  IN:    IV PiggyBack: 650 mL    lactated ringers.: 1022 mL    multivitamin/thiamine/folic acid in sodium chloride 0.9%: 1150 mL    Oral Fluid: 30 mL  Total IN: 2852 mL    OUT:    Indwelling Catheter - Urethral: 555 mL    Voided: 2525 mL  Total OUT: 3080 mL    Total NET: -228 mL          Physical Exam:    General:  Appears stated age, well-groomed, well-nourished, no distress  Chest:  clear breath sounds  Cardiovascular:  Regular rate & rhythm  Abdomen:  Soft, incisions clean, dry intact, tenderness around incisions, no rebound or guarding  Skin:  No rash  Neuro/Psych:  Alert, oriented to time, place and person                           14.6   14.7  )-----------( 188      ( 04 Oct 2017 10:38 )             43.7       10-04    139  |  101  |  13  ----------------------------<  159<H>  4.2   |  24  |  1.05    Ca    8.6      04 Oct 2017 10:38  Phos  1.9     10-04  Mg     2.0     10-04        acetaminophen  IVPB. milliGRAM(s) IV Intermittent once  acetaminophen  IVPB. milliGRAM(s) IV Intermittent once  aluminum hydroxide/magnesium hydroxide/simethicone Suspension milliLiter(s) Oral every 4 hours PRN  ceFAZolin   IVPB milliGRAM(s) IV Intermittent once  heparin  Injectable Unit(s) SubCutaneous every 8 hours  hyoscyamine SL milliGRAM(s) SubLingual every 6 hours  ketorolac   Injectable milliGRAM(s) IV Push every 6 hours  lactated ringers. milliLiter(s) IV Continuous <Continuous>  multivitamin/thiamine/folic acid in sodium chloride 0.9% milliLiter(s) IV Continuous <Continuous>  ondansetron Injectable milliGRAM(s) IV Push every 6 hours  pantoprazole  Injectable milliGRAM(s) IV Push daily  potassium chloride  10 mEq/100 mL IVPB milliEquivalent(s) IV Intermittent every 1 hour PRN          Assessment and Plan:  27y y/o Male s/p Gastrectomy, sleeve, laparoscopic  , POD # 1    - Start PO liquid oxycodone PRN for pain  - Continue ambulation   - Encourage Incentive Spirometer use  - Bariartic clears   - Continue chemical and mechanical DVT prophylaxis  - Discharge home once tolerating adequate PO and 8 point discharge criteria met    Discuss with attending

## 2017-10-05 NOTE — DISCHARGE NOTE ADULT - CARE PROVIDER_API CALL
Reed Felder (MD), Surgery  [address] [facility]  [address] [place] [id number]  Phone: [phone]  Fax: [phone]

## 2017-10-05 NOTE — DISCHARGE NOTE ADULT - PATIENT PORTAL LINK FT
“You can access the Follow[employer] Patient Portal, offered by [facility], by registering with the following website: [email or web address]”

## 2017-10-13 ENCOUNTER — APPOINTMENT (OUTPATIENT)
Dept: SURGERY | Facility: CLINIC | Age: 27
End: 2017-10-13
Payer: COMMERCIAL

## 2017-10-13 DIAGNOSIS — Z01.818 ENCOUNTER FOR OTHER PREPROCEDURAL EXAMINATION: ICD-10-CM

## 2017-10-13 PROCEDURE — 99024 POSTOP FOLLOW-UP VISIT: CPT

## 2017-10-17 ENCOUNTER — APPOINTMENT (OUTPATIENT)
Dept: ULTRASOUND IMAGING | Facility: CLINIC | Age: 27
End: 2017-10-17
Payer: COMMERCIAL

## 2017-10-17 ENCOUNTER — OUTPATIENT (OUTPATIENT)
Dept: OUTPATIENT SERVICES | Facility: HOSPITAL | Age: 27
LOS: 1 days | End: 2017-10-17

## 2017-10-17 DIAGNOSIS — M79.652 PAIN IN LEFT THIGH: ICD-10-CM

## 2017-10-17 DIAGNOSIS — S42.009A FRACTURE OF UNSPECIFIED PART OF UNSPECIFIED CLAVICLE, INITIAL ENCOUNTER FOR CLOSED FRACTURE: Chronic | ICD-10-CM

## 2017-10-17 DIAGNOSIS — Z41.9 ENCOUNTER FOR PROCEDURE FOR PURPOSES OTHER THAN REMEDYING HEALTH STATE, UNSPECIFIED: Chronic | ICD-10-CM

## 2017-10-17 PROBLEM — Z01.818 PREOP EXAMINATION: Status: RESOLVED | Noted: 2017-09-08 | Resolved: 2017-10-17

## 2017-10-17 PROCEDURE — 93971 EXTREMITY STUDY: CPT | Mod: 26,RT

## 2017-12-05 ENCOUNTER — TRANSCRIPTION ENCOUNTER (OUTPATIENT)
Age: 27
End: 2017-12-05

## 2017-12-08 ENCOUNTER — TRANSCRIPTION ENCOUNTER (OUTPATIENT)
Age: 27
End: 2017-12-08

## 2017-12-11 ENCOUNTER — APPOINTMENT (OUTPATIENT)
Dept: SURGERY | Facility: CLINIC | Age: 27
End: 2017-12-11
Payer: COMMERCIAL

## 2017-12-11 VITALS
HEIGHT: 75 IN | DIASTOLIC BLOOD PRESSURE: 82 MMHG | TEMPERATURE: 97.6 F | WEIGHT: 315 LBS | HEART RATE: 75 BPM | SYSTOLIC BLOOD PRESSURE: 131 MMHG | OXYGEN SATURATION: 98 % | RESPIRATION RATE: 16 BRPM | BODY MASS INDEX: 39.17 KG/M2

## 2017-12-11 PROCEDURE — 99024 POSTOP FOLLOW-UP VISIT: CPT

## 2018-03-09 ENCOUNTER — APPOINTMENT (OUTPATIENT)
Dept: SURGERY | Facility: CLINIC | Age: 28
End: 2018-03-09
Payer: COMMERCIAL

## 2018-03-09 VITALS
OXYGEN SATURATION: 98 % | DIASTOLIC BLOOD PRESSURE: 76 MMHG | HEIGHT: 75 IN | SYSTOLIC BLOOD PRESSURE: 115 MMHG | HEART RATE: 82 BPM | BODY MASS INDEX: 34.69 KG/M2 | RESPIRATION RATE: 16 BRPM | TEMPERATURE: 97.9 F | WEIGHT: 279 LBS

## 2018-03-09 DIAGNOSIS — E66.01 MORBID (SEVERE) OBESITY DUE TO EXCESS CALORIES: ICD-10-CM

## 2018-03-09 PROCEDURE — 99214 OFFICE O/P EST MOD 30 MIN: CPT

## 2020-08-26 NOTE — PATIENT PROFILE ADULT. - COMFORT LEVEL, ACCEPTABLE
no 3 Isotretinoin Pregnancy And Lactation Text: This medication is Pregnancy Category X and is considered extremely dangerous during pregnancy. It is unknown if it is excreted in breast milk.

## 2021-11-25 NOTE — H&P PST ADULT - HARM RISK FACTORS
Problem: Falls - Risk of:  Goal: Will remain free from falls  Description: Will remain free from falls  Outcome: Ongoing  Goal: Absence of physical injury  Description: Absence of physical injury  Outcome: Ongoing     Problem: Skin Integrity:  Goal: Will show no infection signs and symptoms  Description: Will show no infection signs and symptoms  Outcome: Ongoing  Goal: Absence of new skin breakdown  Description: Absence of new skin breakdown  Outcome: Ongoing     Problem: Anxiety:  Goal: Level of anxiety will decrease  Description: Level of anxiety will decrease  Outcome: Ongoing     Problem: Infection:  Goal: Will remain free from infection  Description: Will remain free from infection  Outcome: Ongoing     Problem: Safety:  Goal: Free from accidental physical injury  Description: Free from accidental physical injury  Outcome: Ongoing  Goal: Free from intentional harm  Description: Free from intentional harm  Outcome: Ongoing     Problem: Daily Care:  Goal: Daily care needs are met  Description: Daily care needs are met  Outcome: Ongoing     Problem: Pain:  Goal: Patient's pain/discomfort is manageable  Description: Patient's pain/discomfort is manageable  Outcome: Ongoing     Problem: Skin Integrity:  Goal: Skin integrity will stabilize  Description: Skin integrity will stabilize  Outcome: Ongoing     Problem: Discharge Planning:  Goal: Patients continuum of care needs are met  Description: Patients continuum of care needs are met  Outcome: Ongoing no

## 2023-04-01 NOTE — H&P PST ADULT - PROBLEM SELECTOR PROBLEM 1
Morbid obesity
Patient requests all Lab, Cardiology, and Radiology Results on their Discharge Instructions

## 2024-11-11 NOTE — H&P PST ADULT - LAST STRESS TEST
[de-identified] : November 11, 2024 Sinus rhythm @ 76 bpm   [de-identified] : April 1, 2023 Normal LV systolic function LVEF 65-70% Mild MR Mild aortic regurgitation Mild tricuspid regurgitation Mild calcification of the mitral valve annulus.   2017